# Patient Record
Sex: MALE | Race: WHITE | NOT HISPANIC OR LATINO | URBAN - METROPOLITAN AREA
[De-identification: names, ages, dates, MRNs, and addresses within clinical notes are randomized per-mention and may not be internally consistent; named-entity substitution may affect disease eponyms.]

---

## 2019-08-13 ENCOUNTER — EMERGENCY (EMERGENCY)
Facility: HOSPITAL | Age: 24
LOS: 1 days | Discharge: HOME | End: 2019-08-13
Attending: EMERGENCY MEDICINE | Admitting: EMERGENCY MEDICINE
Payer: COMMERCIAL

## 2019-08-13 VITALS
RESPIRATION RATE: 19 BRPM | OXYGEN SATURATION: 98 % | SYSTOLIC BLOOD PRESSURE: 119 MMHG | DIASTOLIC BLOOD PRESSURE: 85 MMHG | HEART RATE: 87 BPM

## 2019-08-13 VITALS
SYSTOLIC BLOOD PRESSURE: 121 MMHG | WEIGHT: 220.02 LBS | DIASTOLIC BLOOD PRESSURE: 83 MMHG | OXYGEN SATURATION: 97 % | RESPIRATION RATE: 18 BRPM | TEMPERATURE: 98 F | HEIGHT: 73 IN | HEART RATE: 89 BPM

## 2019-08-13 DIAGNOSIS — Z78.9 OTHER SPECIFIED HEALTH STATUS: Chronic | ICD-10-CM

## 2019-08-13 PROCEDURE — 99283 EMERGENCY DEPT VISIT LOW MDM: CPT

## 2019-08-13 RX ORDER — IBUPROFEN 200 MG
600 TABLET ORAL ONCE
Refills: 0 | Status: COMPLETED | OUTPATIENT
Start: 2019-08-13 | End: 2019-08-13

## 2019-08-13 RX ORDER — ONDANSETRON 8 MG/1
4 TABLET, FILM COATED ORAL ONCE
Refills: 0 | Status: COMPLETED | OUTPATIENT
Start: 2019-08-13 | End: 2019-08-13

## 2019-08-13 RX ADMIN — ONDANSETRON 4 MILLIGRAM(S): 8 TABLET, FILM COATED ORAL at 18:51

## 2019-08-13 RX ADMIN — Medication 600 MILLIGRAM(S): at 18:51

## 2019-08-13 NOTE — ED PROVIDER NOTE - NSFOLLOWUPINSTRUCTIONS_ED_ALL_ED_FT
1. FOLLOW UP WITH YOUR PRIMARY DOCTOR IN 24-48 HOURS.   2. FOLLOW UP WITH ALL SPECIALIST DISCUSSED DURING YOUR VISIT.   3. TAKE ALL MEDICATIONS PRESCRIBED IN THE ER IF ANY ARE PRESCRIBED. CONTINUE YOUR HOME MEDICATIONS UNLESS OTHERWISE ADVISED DIFFERENTLY.   4. RETURN FOR WORSENING SYMPTOMS OR CONCERNS INCLUDING BUT NOT LIMITED TO FEVER, CHEST PAIN, OR TROUBLE BREATHING OR ANY OTHER CONCERNS   5. do not drive or play sports while symptomatic   5. take ibuprofen 600mg every 6 hours as needed for headache or tylenol 650mg every 6 hours. 1. FOLLOW UP WITH YOUR PRIMARY DOCTOR IN 24-48 HOURS.   2. FOLLOW UP WITH ALL SPECIALIST DISCUSSED DURING YOUR VISIT.   3. TAKE ALL MEDICATIONS PRESCRIBED IN THE ER IF ANY ARE PRESCRIBED. CONTINUE YOUR HOME MEDICATIONS UNLESS OTHERWISE ADVISED DIFFERENTLY.   4. RETURN FOR WORSENING SYMPTOMS OR CONCERNS INCLUDING BUT NOT LIMITED TO FEVER, CHEST PAIN, OR TROUBLE BREATHING OR ANY OTHER CONCERNS   5. do not drive or play sports while symptomatic   5. take for headache tylenol 650mg every 6 hours.

## 2019-08-13 NOTE — ED PROVIDER NOTE - CLINICAL SUMMARY MEDICAL DECISION MAKING FREE TEXT BOX
pt with head injury, probable concussion. pt refused head ct. pt given ibuprofen and zofran in ed. pt given head injury precuations. advised concussion clinic follow up and motrin for pain. pt advised to not drive or play sports when symptomatic. advised pmd follow up.  All questions answered and concerns addressed. pt verbalized understanding and agreement with plan and dx. pt advised on next step and when/where to follow up. pt advised on all take home and otc medications. pt advised to follow up with PMD. pt advised to return to ed for worsenng symptoms including fever, cp, sob. will dc. pt with head injury, probable concussion. pt refused head ct. pt given ibuprofen and zofran in ed. pt given head injury precautions. advised concussion clinic follow up and tylenol for pain. pt advised to not drive or play sports when symptomatic. advised pmd follow up.  All questions answered and concerns addressed. pt verbalized understanding and agreement with plan and dx. pt advised on next step and when/where to follow up. pt advised on all take home and otc medications. pt advised to follow up with PMD. pt advised to return to ed for worsenng symptoms including fever, cp, sob. will dc.

## 2019-08-13 NOTE — ED PROVIDER NOTE - OBJECTIVE STATEMENT
pt is a 25yo male with pmhx of anxiety presents s/p head injury. pt reports a 20lb box fell 9feet on to his head without loc. pt reports acute onset of vision changes that improved and nausea without vomiting. pt did not take anything for symptoms. pt went too [facility] who refused to see pt so he came to ed. pt denies numbness, tingling, vomiting, loc, headache.

## 2019-08-13 NOTE — ED PROVIDER NOTE - CHPI ED SYMPTOMS NEG
no change in level of consciousness/no blurred vision/no seizure/no weakness/no confusion/no vomiting/no dizziness/no syncope/no loss of consciousness

## 2019-08-13 NOTE — ED ADULT NURSE NOTE - NSIMPLEMENTINTERV_GEN_ALL_ED
Implemented All Universal Safety Interventions:  [place] to call system. Call bell, personal items and telephone within reach. Instruct patient to call for assistance. Room bathroom lighting operational. Non-slip footwear when patient is off stretcher. Physically safe environment: no spills, clutter or unnecessary equipment. Stretcher in lowest position, wheels locked, appropriate side rails in place.

## 2019-08-13 NOTE — ED ADULT TRIAGE NOTE - CHIEF COMPLAINT QUOTE
states "a 20lb box fell on my head and now I'm nauseous" Per patient at work box fell on head, "vision went out" for 2 seconds, denies falling and loss of consciousness.

## 2019-08-13 NOTE — ED PROVIDER NOTE - ATTENDING CONTRIBUTION TO CARE
pt is a healthy male no sig pmhx psxh pmd dr wooten was at work and a box of electronic equipment fell off a shelf and struck him in top of head. no loc but he co transient blurry vision. no neck or back pain no other complaints bib mom for eval declining any advanced imaging  on eval:  HEENT small area of redness and swelling paretotemporal on left with tendre no lac no softness of skull tm clear   neuro normal  neck normal  lungs heart normal  plan:  on head injury tyleno and ice for pain  refer to concussion clinic

## 2019-08-13 NOTE — ED ADULT NURSE NOTE - OBJECTIVE STATEMENT
pt to ED s/p 200 lb box falling his head at work, no LOC, no abrasions, no change in mental status, no visual changes, reports nausea & headache.

## 2019-08-16 ENCOUNTER — TRANSCRIPTION ENCOUNTER (OUTPATIENT)
Age: 24
End: 2019-08-16

## 2019-11-24 ENCOUNTER — EMERGENCY (EMERGENCY)
Facility: HOSPITAL | Age: 24
LOS: 1 days | Discharge: HOME | End: 2019-11-24
Attending: EMERGENCY MEDICINE | Admitting: EMERGENCY MEDICINE
Payer: MEDICAID

## 2019-11-24 VITALS
SYSTOLIC BLOOD PRESSURE: 135 MMHG | HEART RATE: 86 BPM | DIASTOLIC BLOOD PRESSURE: 86 MMHG | TEMPERATURE: 98 F | RESPIRATION RATE: 16 BRPM | OXYGEN SATURATION: 100 %

## 2019-11-24 VITALS
HEART RATE: 80 BPM | SYSTOLIC BLOOD PRESSURE: 139 MMHG | OXYGEN SATURATION: 100 % | RESPIRATION RATE: 16 BRPM | DIASTOLIC BLOOD PRESSURE: 86 MMHG | TEMPERATURE: 98 F

## 2019-11-24 DIAGNOSIS — F31.11 BIPOLAR DISORDER, CURRENT EPISODE MANIC WITHOUT PSYCHOTIC FEATURES, MILD: ICD-10-CM

## 2019-11-24 DIAGNOSIS — Z78.9 OTHER SPECIFIED HEALTH STATUS: Chronic | ICD-10-CM

## 2019-11-24 PROCEDURE — 90792 PSYCH DIAG EVAL W/MED SRVCS: CPT

## 2019-11-24 PROCEDURE — 99283 EMERGENCY DEPT VISIT LOW MDM: CPT

## 2019-11-24 NOTE — ED PROVIDER NOTE - NSFOLLOWUPINSTRUCTIONS_ED_ALL_ED_FT
Take your medications as prescribed.  Follow-up with your psychiatrist this week as scheduled.  You can also follow-up at [facility] [phone].  Return to the emergency department for any new or worsening symptoms or concerns.

## 2019-11-24 NOTE — ED PROVIDER NOTE - PATIENT PORTAL LINK FT
You can access the FollowMyHealth Patient Portal offered by [facility] by registering at the following website: [email or web address]. By joining [email or web address]’s FollowMyHealth portal, you will also be able to view your health information using other applications (apps) compatible with our system.

## 2019-11-24 NOTE — ED ADULT NURSE NOTE - CHIEF COMPLAINT QUOTE
Pt states that he was diagnosed with Bipolar 1 month ago during inpatient psychiatric hospitalization at [facility], pt states that he was placed on Depakote and Latuda, pt states that he has racing thoughts combined with long periods of sleep.  Pt denies SI/HI, pt calm and cooperative in triage, denies drug or alcohol use states that he is a a  and has been in recovery himself for 4 years
Verbal/written post procedure instructions were given to patient/caregiver

## 2019-11-24 NOTE — ED ADULT TRIAGE NOTE - CHIEF COMPLAINT QUOTE
Pt states that he was diagnosed with Bipolar 1 month ago during inpatient psychiatric hospitalization at [facility], pt states that he was placed on Depakote and Latuda, pt states that he has racing thoughts combined with long periods of sleep.  Pt denies SI/HI, pt calm and cooperative in triage, denies drug or alcohol use states that he is a a  and has been in recovery himself for 4 years

## 2019-11-24 NOTE — ED PROVIDER NOTE - PROGRESS NOTE DETAILS
Sam HANKINS: Pt signed out to me.  Depakote level is therapeutic.  Pt has been evaluated by psychiatry - while pt appears in hypomanic state, no active SI, no emergent indication for psychiatric admission.  Pt is cleared for discharge.  Family has been made aware by psychiatry and will take pt home in their care.

## 2019-11-24 NOTE — ED ADULT NURSE NOTE - NSIMPLEMENTINTERV_GEN_ALL_ED
Implemented All Universal Safety Interventions:  [place] to call system. Call bell, personal items and telephone within reach. Instruct patient to call for assistance. Room bathroom lighting operational. Non-slip footwear when patient is off stretcher. Physically safe environment: no spills, clutter or unnecessary equipment. Stretcher in lowest position, wheels locked, appropriate side rails in place.

## 2019-11-24 NOTE — ED BEHAVIORAL HEALTH ASSESSMENT NOTE - ACTIVATING EVENTS/STRESSORS
Non-compliant or not receiving treatment/Change in provider or treatment (i.e., medications, psychotherapy, milieu)/Recent inpatient discharge

## 2019-11-24 NOTE — ED BEHAVIORAL HEALTH ASSESSMENT NOTE - SUMMARY
24 year olf  male, single , part time employed , domicile with family at home , with no significant medical history , with hx of OCD, PTSD , and Bipolar I  d/o, with hx of 2  psychiatric hospitalization, most recent at [facility] from 11/5/19-11/12/19 for manic episode , stabilized on Depakote 1500mg qhs and Latuda 40mg qhs . Patient was supposed to follow up last week for an intake at [facility], but missed appointment because he presented late, rescheduled for this coming this week on 11/27/19. Patient has hx of self aborted sa , last one in June sat on the platform of the train track contemplating to jump but decided against it and states he never told anyone) , and previous attempted to OD on drugs and medications. No hx of of NSSIB . No hx of violence, has hx of arrest for DWI in 2015 . Patient also has significant hx of drug use including Marijuana, cocaine, heroin AND BENZO ABUSE   and has been in outpatient treatment , has been sober since 2015 , attends AA meetings and says he is a . Patient BIB family for manic symptoms. 24 year olf  male, single , part time employed , domicile with family at home , with no significant medical history , with hx of OCD, PTSD , and Bipolar I  d/o, with hx of 2  psychiatric hospitalization, most recent at [facility] from 11/5/19-11/12/19 for manic episode , stabilized on Depakote 1500mg qhs and Latuda 40mg qhs . Patient was supposed to follow up last week for an intake at [facility], but missed appointment because he presented late, rescheduled for this coming this week on 11/27/19. Patient has hx of self aborted sa , last one in June sat on the platform of the train track contemplating to jump but decided against it and states he never told anyone) , and previous attempted to OD on drugs and medications. No hx of of NSSIB . No hx of violence, has hx of arrest for DWI in 2015 . Patient also has significant hx of drug use including Marijuana, cocaine, heroin AND BENZO ABUSE   and has been in outpatient treatment , has been sober since 2015 , attends AA meetings and says he is a . Patient BIB family for manic symptoms.    On assessment pt is calm, cooperative,  no aggression or agitation. He does not exhibit any psychotic sx. Pt presents with hypomanic/ mild manic sx , however he is able to admit to his recent impulsive behaviors and currently agrees that the best thing for him is to stay home and no longer travel and see his outpatient psychiatrist this coming Wednesday. He is also in agreement to not drive and parents will take the keys to the car away until his medications and are further adjusted  and symptoms improve.  He denies any si/hi/i/p, there has not been any violence or aggression towards anyone or the family members. No possession of guns.  His VPA level came back wnl , which indicates he is compliant with medications but may need adjustment /optimization to the dosages of his medications to have better control of his sx. He is also agreeing to have his parents overseeing his medication intake . Pt is currently not an imminent danger to self or others and does not meet criteria for involuntary admission, he is declining voluntary admission . Pt is psychiatrically cleared.

## 2019-11-24 NOTE — ED BEHAVIORAL HEALTH NOTE - BEHAVIORAL HEALTH NOTE
ELIDA met with pt's mother, Pat, pt's sister, aunt, and friend in order to obtain collateral information.  Pt's mother reports concern regarding pt's well being since he was discharged from [facility] about two weeks ago.  Mother reports that pt has been engaging in behaviors such as going out of state and coming back the next day for example going to [place] and returning, as well as going to his college in [place], sitting in the parking lot, and trying to make amends.  She states that pt has also traveled to [place] at random times and without telling anyone.  She states that pt has run out of gas while traveling down, has left the car on the side of the road with the keys still in it, and then continued his trip to [place] by taking an uber the rest of the way.  Mother states that as recently as yesterday she had to go to [place] to pick pt up from [place] ELIDA met with pt's mother, Pat, pt's sister, aunt, and friend in order to obtain collateral information.  Pt's mother reports concern regarding pt's well being since he was discharged from [facility] about two weeks ago.  Mother reports that pt has been engaging in behaviors such as going out of state and coming back the next day for example going to [place] and returning, as well as going to his college in [place], sitting in the parking lot, and trying to make amends.  She states that pt has also traveled to [place] at random times and without telling anyone.  She states that pt has run out of gas while traveling down, has left the car on the side of the road with the keys still in it, and then continued his trip to [place] by taking an uber the rest of the way.  Mother states that as recently as yesterday she had to go to [place] to pick pt up from [place].  She reports the during the car ride home, pt stated 'maybe he would choke himself out" in response to mother's crying while in the car.  mother reports she is not sure what pt meant by that, however family is concerned that it could have been a suicidal statement.    Pt's sister reports that pt recently posted a photo of himself on [employer] with a belt around his neck.  She also states that he is posting videos pf himself while he is driving his car.  Sister also states that pt is giving away his shoes and other items.  She states that pt is also hoarding things and packing things into their mother's car.    Pt's mother and sister report that pt is maxing out his credit cards. They are concerned that pt will take off to [place] tomorrow to see a  and 'to be a rapper'.  Mother reports that pt is not sleeping, seems disoriented at times, and is staying out of the house at various times. ELIDA met with pt's mother, Pat, pt's sister, aunt, and friend in order to obtain collateral information.  Pt's mother reports concern regarding pt's well being since he was discharged from [facility] about two weeks ago.  Mother reports that pt has been engaging in behaviors such as going out of state and coming back the next day for example going to [place] and returning, as well as going to his college in [place], sitting in the parking lot, and trying to make amends.  She states that pt has also traveled to [place] at random times and without telling anyone.  She states that pt has run out of gas while traveling down, has left the car on the side of the road with the keys still in it, and then continued his trip to [place] by taking an uber the rest of the way.  Mother states that as recently as yesterday she had to go to [place] to pick pt up from [place].  She reports the during the car ride home, pt stated 'maybe he would choke himself out" in response to mother's crying while in the car.  mother reports she is not sure what pt meant by that, however family is concerned that it could have been a suicidal statement.    Pt's sister reports that pt recently posted a photo of himself on [employer] with a belt around his neck.  She also states that he is posting videos pf himself while he is driving his car.  Sister also states that pt is giving away his shoes and other items.  She states that pt is also hoarding things and packing things into their mother's car.    Pt's mother and sister report that pt is maxing out his credit cards. They are concerned that pt will take off to [place] tomorrow to see a  and 'to be a rapper'.  Mother reports that pt is not sleeping, seems disoriented at times, and is staying out of the house at various times.    Pt's mother also reports that due to recent decline in functioning, pt lost his job and is currently unemployed.

## 2019-11-24 NOTE — ED ADULT NURSE REASSESSMENT NOTE - NS ED NURSE REASSESS COMMENT FT1
Pt belongings checked and secured by staff.  Pt checked by metal detector.  Pt changed into gown and scrubs.  Pt awaiting Psych assessment..

## 2019-11-24 NOTE — ED PROVIDER NOTE - OBJECTIVE STATEMENT
23 y/o M hx  Bipolar  BIB family w c/o depression and racing thoughts .   Admits that his medications were recently changed.   Denies SI/HI/AH/VH. Denies pain, SOB, fever, chills chest/ abdominal  discomfort. Denies falling, punching or kicking any objects. Denies recent use of alcohol or illicit drugs. No evidence of physical injuries skin or deformities.

## 2019-11-24 NOTE — ED ADULT NURSE NOTE - OBJECTIVE STATEMENT
23 yo M received in .  Pt's mother and sister report that pt is maxing out his credit cards. They are concerned that pt will take off to [place] tomorrow to see a  and 'to be a rapper'.  Mother reports that pt is not sleeping, seems disoriented at times, and is staying out of the house at various times.  Pt states that he was diagnosed with Bipolar 1 month ago during inpatient psychiatric hospitalization at [facility].  Pt denies SI/HI, pt calm and cooperative.

## 2019-11-24 NOTE — ED PROVIDER NOTE - CLINICAL SUMMARY MEDICAL DECISION MAKING FREE TEXT BOX
25 y/o M hx  Bipolar   Labs  Medical evaluation performed. There is no clinical evidence of intoxication or any acute medical problem requiring immediate intervention. Patient is awaiting psychiatric consultation. Final disposition will be determined by psychiatrist.

## 2019-11-24 NOTE — ED BEHAVIORAL HEALTH ASSESSMENT NOTE - DESCRIPTION
calm ,cooperative, has not required any prns while in ED  Vital Signs Last 24 Hrs  T(C): 36.8 (24 Nov 2019 20:12), Max: 36.8 (24 Nov 2019 20:12)  T(F): 98.2 (24 Nov 2019 20:12), Max: 98.2 (24 Nov 2019 20:12)  HR: 86 (24 Nov 2019 20:12) (86 - 86)  BP: 135/86 (24 Nov 2019 20:12) (135/86 - 135/86)  BP(mean): --  RR: 16 (24 Nov 2019 20:12) (16 - 16)  SpO2: 100% (24 Nov 2019 20:12) (100% - 100%) none as per hpi calm ,cooperative, has not required any prns while in ED  VPA LEVEL: 55.9  Vital Signs Last 24 Hrs  T(C): 36.8 (24 Nov 2019 20:12), Max: 36.8 (24 Nov 2019 20:12)  T(F): 98.2 (24 Nov 2019 20:12), Max: 98.2 (24 Nov 2019 20:12)  HR: 86 (24 Nov 2019 20:12) (86 - 86)  BP: 135/86 (24 Nov 2019 20:12) (135/86 - 135/86)  BP(mean): --  RR: 16 (24 Nov 2019 20:12) (16 - 16)  SpO2: 100% (24 Nov 2019 20:12) (100% - 100%)

## 2019-11-24 NOTE — ED PROVIDER NOTE - NSFOLLOWUPCLINICS_GEN_ALL_ED_FT
[facility]  Behavioral Health  [id number] [place] [address]  Phone: [phone]  Fax:   Follow Up Time:

## 2019-11-24 NOTE — ED BEHAVIORAL HEALTH ASSESSMENT NOTE - OTHER
recent discharge from inpatient and recent diagnosis of Bipolar d/o fast but interruptable "manic" elevated "some grandiosity , wants to rap, persue sports , spread hope"

## 2019-11-24 NOTE — ED BEHAVIORAL HEALTH ASSESSMENT NOTE - RISK ASSESSMENT
pt currently is low acute risk for self harm , given lack of any si/i/p. He has risk factors including manic sx, noncompliance with medications, recent inpatient hospitalization and recent diagnosis of Bipolar d/o, prior sa  . Patient has protective factors including,  future oriented , provides reasons for living , has outpatient team . Low Acute Suicide Risk
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
## 2019-11-24 NOTE — ED BEHAVIORAL HEALTH ASSESSMENT NOTE - SUICIDE PROTECTIVE FACTORS
Identifies reasons for living/Supportive social network of family or friends/Has future plans/Responsibility to family and others/Engaged in work or school

## 2019-11-25 PROBLEM — F41.9 ANXIETY DISORDER, UNSPECIFIED: Chronic | Status: ACTIVE | Noted: 2019-08-13

## 2019-11-25 LAB — VALPROATE SERPL-MCNC: 55.9 UG/ML — SIGNIFICANT CHANGE UP (ref 50–100)

## 2022-06-20 ENCOUNTER — EMERGENCY (EMERGENCY)
Facility: HOSPITAL | Age: 27
LOS: 1 days | Discharge: HOME | End: 2022-06-20
Attending: EMERGENCY MEDICINE | Admitting: EMERGENCY MEDICINE
Payer: MEDICAID

## 2022-06-20 VITALS
HEART RATE: 85 BPM | WEIGHT: 225.09 LBS | HEIGHT: 73 IN | SYSTOLIC BLOOD PRESSURE: 124 MMHG | TEMPERATURE: 99 F | OXYGEN SATURATION: 99 % | DIASTOLIC BLOOD PRESSURE: 83 MMHG | RESPIRATION RATE: 17 BRPM

## 2022-06-20 DIAGNOSIS — Z78.9 OTHER SPECIFIED HEALTH STATUS: Chronic | ICD-10-CM

## 2022-06-20 PROCEDURE — 99283 EMERGENCY DEPT VISIT LOW MDM: CPT

## 2022-06-20 PROCEDURE — 99282 EMERGENCY DEPT VISIT SF MDM: CPT

## 2022-06-20 RX ORDER — LURASIDONE HYDROCHLORIDE 40 MG/1
0 TABLET ORAL
Qty: 0 | Refills: 0 | DISCHARGE

## 2022-06-20 RX ORDER — SERTRALINE 25 MG/1
1 TABLET, FILM COATED ORAL
Qty: 0 | Refills: 0 | DISCHARGE

## 2022-06-20 RX ORDER — DIVALPROEX SODIUM 500 MG/1
0 TABLET, DELAYED RELEASE ORAL
Qty: 0 | Refills: 0 | DISCHARGE

## 2022-06-20 NOTE — ED PROVIDER NOTE - CLINICAL SUMMARY MEDICAL DECISION MAKING FREE TEXT BOX
pt with one episode of bloody stool.  no other symptoms, no meds, no new foods, no complaints of abd pain.  occult positive, no obvious hemorrhoids.  fu with GI and pt to return for continued symptoms

## 2022-06-20 NOTE — ED ADULT TRIAGE NOTE - PAIN RATING/NUMBER SCALE (0-10): ACTIVITY
Discharging patient from INR clinic services due to non compliance.  He has had 5 no show appointments has  2 canceled INR visits since last INR check in October.  He has been notified several times via phone and mail regarding compliance problems.    Message sent to PCP as an FYI.  Kaitlin Lassiter RN     0

## 2022-06-20 NOTE — ED PROVIDER NOTE - PHYSICAL EXAMINATION
Constitutional: Awake, Alert, non-toxic. NAD. Well appearing, well nourished.   HEAD: Normocephalic, atraumatic.   EYES: EOM intact, conjunctiva and sclera are clear bilaterally.   ENT: No rhinorrhea, patent, mucous membranes pink/moist, no drooling or stridor.   NECK: Supple, non-tender  CARDIOVASCULAR: Normal S1, S2; regular rate and rhythm.  RESPIRATORY: Normal respiratory effort; breath sounds CTAB, no wheezes, rhonchi, or rales. Speaking in full sentences. No accessory muscle use.   ABDOMEN: Soft; non-tender, non-distended.   RECTAL: (+) bright red blood, no active bleeding/oozing. No obvious external hemorrhoid. no palpable mass.   EXTREMITIES: Full passive and active ROM in all extremities; non-tender to palpation; distal pulses palpable and symmetric  SKIN: Warm, dry; good skin turgor, no apparent lesions or rashes, no ecchymosis, brisk capillary refill.  NEURO: A&O x3. Sensory and motor functions are grossly intact. Speech is normal. Appearance and judgement seem appropriate for gender and age.

## 2022-06-20 NOTE — ED PROVIDER NOTE - NS ED ATTENDING STATEMENT MOD
This was a shared visit with the FREDERICK. I reviewed and verified the documentation and independently performed the documented:

## 2022-06-20 NOTE — ED PROVIDER NOTE - PATIENT PORTAL LINK FT
You can access the FollowMyHealth Patient Portal offered by [facility] by registering at the following website: [email or web address]. By joining [facility]’s FollowMyHealth portal, you will also be able to view your health information using other applications (apps) compatible with our system.

## 2022-06-20 NOTE — ED PROVIDER NOTE - OBJECTIVE STATEMENT
27 y/o male without reported PMHx presents today due to bloody stools. pt reports he has been feeling constipated for a long time. reports he had a bowel movement 30 min prior to arrival in which dark red blood was smeared on top of water. pt denies abdominal pain, fever, vomiting, nausea, blood thinner use, hx of anemia, dizziness, diarrhea, hx of colitis/diverticulitis, or any other complaints.

## 2022-06-20 NOTE — ED PROVIDER NOTE - CARE PROVIDER_API CALL
Stewart Schililng ()  Internal Medicine  [address] [place] [address]  Phone: [phone]  Fax: [phone]  Follow Up Time:

## 2022-06-20 NOTE — ED PROVIDER NOTE - NSFOLLOWUPINSTRUCTIONS_ED_ALL_ED_FT
Rectal Bleeding       Rectal bleeding is when blood comes out of the opening of the butt (anus). People with this kind of bleeding may notice bright red blood in their underwear or in the toilet after they poop (have a bowel movement). They may also have blood mixed with their poop (stool), or dark red or black poop.    Rectal bleeding is often a sign that something is wrong. This condition can be caused by many things. It needs to be checked by a doctor. Your doctor will do tests to know what is causing your condition.      Follow these instructions at home:    Watch for any changes in your condition. Take these actions to help with bleeding and discomfort:    Medicines     •Take over-the-counter and prescription medicines only as told by your doctor.      •Ask your doctor about changing or stopping your normal medicines. This is important if you are taking blood thinners. Medicines that thin the blood can make rectal bleeding worse.        Managing constipation      Your condition may cause trouble pooping (constipation). To prevent or treat trouble pooping, or to help make your poop soft, you may need to:  •Drink enough fluid to keep your pee (urine) pale yellow.      •Take over-the-counter or prescription medicines.      •Eat foods that are high in fiber. These include beans, whole grains, and fresh fruits and vegetables.      •Limit foods that are high in fat and sugar. These include fried or sweet foods.      General instructions     •Try not to strain when you poop.      •Try taking a warm bath. This may help with pain.      •Keep all follow-up visits as told by your doctor. This is important.        Contact a doctor if:    •You have pain or swelling in your belly (abdomen).      •You have a fever.      •You feel weak.      •You feel like you may vomit.      •You cannot poop.        Get help right away if:    •You have new bleeding.      •You have more bleeding than before.      •You have black or dark red poop.      •You vomit blood or something that looks like coffee grounds.      •You pass out (faint).      •You have very bad pain in your butt.        Summary    •Rectal bleeding is when blood comes out of the opening of the butt. This bleeding is often a sign that something is wrong.      •Eat a diet that is high in fiber. This will help to keep your poop soft.      •Talk to your doctor if you take medicines that thin the blood. These medicines can make bleeding worse.      •Get help right away if you have new or more bleeding, black or dark red poop, or blood in your vomit. Also, get help if you pass out or have very bad pain in your butt.      This information is not intended to replace advice given to you by your health care provider. Make sure you discuss any questions you have with your health care provider. Follow up with your primary care doctor and gastroenterologist. return for multiple bloody bowel movements, black stool, abdominal pain, fever, vomiting, nausea, dizziness, palpitations, worsening condition. Consider use of stool softeners.     Rectal Bleeding       Rectal bleeding is when blood comes out of the opening of the butt (anus). People with this kind of bleeding may notice bright red blood in their underwear or in the toilet after they poop (have a bowel movement). They may also have blood mixed with their poop (stool), or dark red or black poop.    Rectal bleeding is often a sign that something is wrong. This condition can be caused by many things. It needs to be checked by a doctor. Your doctor will do tests to know what is causing your condition.      Follow these instructions at home:    Watch for any changes in your condition. Take these actions to help with bleeding and discomfort:    Medicines     •Take over-the-counter and prescription medicines only as told by your doctor.      •Ask your doctor about changing or stopping your normal medicines. This is important if you are taking blood thinners. Medicines that thin the blood can make rectal bleeding worse.        Managing constipation      Your condition may cause trouble pooping (constipation). To prevent or treat trouble pooping, or to help make your poop soft, you may need to:  •Drink enough fluid to keep your pee (urine) pale yellow.      •Take over-the-counter or prescription medicines.      •Eat foods that are high in fiber. These include beans, whole grains, and fresh fruits and vegetables.      •Limit foods that are high in fat and sugar. These include fried or sweet foods.      General instructions     •Try not to strain when you poop.      •Try taking a warm bath. This may help with pain.      •Keep all follow-up visits as told by your doctor. This is important.        Contact a doctor if:    •You have pain or swelling in your belly (abdomen).      •You have a fever.      •You feel weak.      •You feel like you may vomit.      •You cannot poop.        Get help right away if:    •You have new bleeding.      •You have more bleeding than before.      •You have black or dark red poop.      •You vomit blood or something that looks like coffee grounds.      •You pass out (faint).      •You have very bad pain in your butt.        Summary    •Rectal bleeding is when blood comes out of the opening of the butt. This bleeding is often a sign that something is wrong.      •Eat a diet that is high in fiber. This will help to keep your poop soft.      •Talk to your doctor if you take medicines that thin the blood. These medicines can make bleeding worse.      •Get help right away if you have new or more bleeding, black or dark red poop, or blood in your vomit. Also, get help if you pass out or have very bad pain in your butt.      This information is not intended to replace advice given to you by your health care provider. Make sure you discuss any questions you have with your health care provider.

## 2022-06-20 NOTE — ED PROVIDER NOTE - NSICDXNOPASTSURGICALHX_GEN_ALL_ED
Weakness / Ataxia / Possible alcohol intoxication Weakness / Ataxia / Possible alcohol intoxication <-- Click to add NO significant Past Surgical History

## 2022-06-20 NOTE — ED ADULT NURSE NOTE - OBJECTIVE STATEMENT
Patient is a 27yo male complaining of bloody stools happening 30 minutes ago one episode. Patient denies nausea vomiting diarrhea fever Patient denies pain in the rectum at this time and at the time of the bloody bowel movement. Patient has good coloring
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
## 2022-06-20 NOTE — ED ADULT TRIAGE NOTE - CHIEF COMPLAINT QUOTE
patient reports one episode of dark red blood in stool today. Patient denies any abdominal pain, denies any medical problems.

## 2023-06-19 NOTE — ED BEHAVIORAL HEALTH ASSESSMENT NOTE - DOMICILED WITH
Care Due:                  Date            Visit Type   Department     Provider  --------------------------------------------------------------------------------                                EP -                              PRIMARY      [facility]  Last Visit: 06-      CARE ([facility])   EDER Turner                              EP -                              PRIMARY      [facility]  Next Visit: 07-      CARE ([facility])   EDER Turner                                                            Last  Test          Frequency    Reason                     Performed    Due Date  --------------------------------------------------------------------------------    CMP.........  12 months..  ezetimibe,                 06- 06-                             lisinopriL-hydrochlorothi                             azide....................    Lipid Panel.  12 months..  ezetimibe................  06- 06-    [facility] Embedded Care Due Messages. Reference number: [id number].   6/19/2023 8:56:32 AM CDT  
Refill Routing Note   Medication(s) are not appropriate for processing by [facility] for the following reason(s):      Medication outside of protocol  Required labs outdated  Required vitals outdated    [facility] action(s):  Defer  Route Care Due:  Labs due          Appointments  past 12m or future 3m with PCP    Date Provider   Last Visit   6/20/2022 Mary Turner MD   Next Visit   7/27/2023 Mary Turner MD   ED visits in past 90 days: 0        Note composed:4:23 PM 06/19/2023             
Family

## 2023-10-11 PROBLEM — Z00.00 ENCOUNTER FOR PREVENTIVE HEALTH EXAMINATION: Status: ACTIVE | Noted: 2023-10-11

## 2023-10-13 PROBLEM — F31.9 BIPOLAR DISORDER, UNSPECIFIED: Chronic | Status: ACTIVE | Noted: 2022-06-20

## 2023-10-20 ENCOUNTER — APPOINTMENT (OUTPATIENT)
Dept: CARDIOLOGY | Facility: CLINIC | Age: 28
End: 2023-10-20

## 2023-11-02 ENCOUNTER — NON-APPOINTMENT (OUTPATIENT)
Age: 28
End: 2023-11-02

## 2023-11-02 ENCOUNTER — APPOINTMENT (OUTPATIENT)
Dept: CARDIOLOGY | Facility: CLINIC | Age: 28
End: 2023-11-02
Payer: MEDICAID

## 2023-11-02 VITALS
WEIGHT: 226 LBS | OXYGEN SATURATION: 99 % | HEIGHT: 73 IN | BODY MASS INDEX: 29.95 KG/M2 | HEART RATE: 69 BPM | SYSTOLIC BLOOD PRESSURE: 102 MMHG | DIASTOLIC BLOOD PRESSURE: 66 MMHG

## 2023-11-02 DIAGNOSIS — R94.31 ABNORMAL ELECTROCARDIOGRAM [ECG] [EKG]: ICD-10-CM

## 2023-11-02 PROCEDURE — 93000 ELECTROCARDIOGRAM COMPLETE: CPT

## 2023-11-02 PROCEDURE — 99204 OFFICE O/P NEW MOD 45 MIN: CPT | Mod: 25

## 2023-11-02 RX ORDER — DIVALPROEX SODIUM 250 MG/1
TABLET, EXTENDED RELEASE ORAL
Refills: 0 | Status: ACTIVE | COMMUNITY

## 2023-11-02 RX ORDER — LURASIDONE HYDROCHLORIDE 60 MG/1
60 TABLET, FILM COATED ORAL
Refills: 0 | Status: ACTIVE | COMMUNITY

## 2023-11-09 ENCOUNTER — NON-APPOINTMENT (OUTPATIENT)
Age: 28
End: 2023-11-09

## 2023-11-09 ENCOUNTER — APPOINTMENT (OUTPATIENT)
Dept: CARDIOLOGY | Facility: CLINIC | Age: 28
End: 2023-11-09
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
## 2025-08-28 ENCOUNTER — APPOINTMENT (OUTPATIENT)
Dept: ORTHOPEDIC SURGERY | Facility: CLINIC | Age: 30
End: 2025-08-28
Payer: COMMERCIAL

## 2025-08-28 VITALS — HEIGHT: 73 IN | WEIGHT: 195 LBS | BODY MASS INDEX: 25.84 KG/M2

## 2025-08-28 DIAGNOSIS — M48.061 SPINAL STENOSIS, LUMBAR REGION WITHOUT NEUROGENIC CLAUDICATION: ICD-10-CM

## 2025-08-28 DIAGNOSIS — M54.16 RADICULOPATHY, LUMBAR REGION: ICD-10-CM

## 2025-08-28 DIAGNOSIS — M51.362: ICD-10-CM

## 2025-08-28 PROCEDURE — 72100 X-RAY EXAM L-S SPINE 2/3 VWS: CPT

## 2025-08-28 PROCEDURE — 99203 OFFICE O/P NEW LOW 30 MIN: CPT

## 2025-08-28 PROCEDURE — 73502 X-RAY EXAM HIP UNI 2-3 VIEWS: CPT

## 2025-08-28 RX ORDER — TIZANIDINE 4 MG/1
4 TABLET ORAL
Qty: 60 | Refills: 0 | Status: ACTIVE | COMMUNITY
Start: 2025-08-28 | End: 1900-01-01

## 2025-08-28 RX ORDER — METHYLPREDNISOLONE 4 MG/1
4 TABLET ORAL
Qty: 1 | Refills: 1 | Status: ACTIVE | COMMUNITY
Start: 2025-08-28 | End: 1900-01-01

## 2025-08-28 RX ORDER — DICLOFENAC SODIUM 75 MG/1
75 TABLET, DELAYED RELEASE ORAL
Qty: 60 | Refills: 0 | Status: ACTIVE | COMMUNITY
Start: 2025-08-28 | End: 1900-01-01